# Patient Record
Sex: MALE | HISPANIC OR LATINO | URBAN - METROPOLITAN AREA
[De-identification: names, ages, dates, MRNs, and addresses within clinical notes are randomized per-mention and may not be internally consistent; named-entity substitution may affect disease eponyms.]

---

## 2018-06-13 ENCOUNTER — OFFICE VISIT (OUTPATIENT)
Dept: URBAN - METROPOLITAN AREA CLINIC 48 | Facility: CLINIC | Age: 68
End: 2018-06-13
Payer: COMMERCIAL

## 2018-06-13 PROCEDURE — 92014 COMPRE OPH EXAM EST PT 1/>: CPT | Performed by: OPHTHALMOLOGY

## 2018-06-13 ASSESSMENT — INTRAOCULAR PRESSURE
OD: 9
OS: 11

## 2018-06-13 NOTE — IMPRESSION/PLAN
Impression: Diplopia: H53.2.3 primary gaze:  ortho with fragoso and ACT
right gaze: 4LHT on fragoso, and possible ET flick on ACT but patient complains that his vision completely blurs when looks to right. This was tested without ACT several times. left gaze: 1LHT on fragoso, ortho on ACT
rigth head Ortho on fragoso
left head tilt Ortho on fragoso Plan: patient describes double vision as side to side.  patient wishes to continue to monitor without specialist consult

rtc 6 month

## 2018-11-29 ENCOUNTER — OFFICE VISIT (OUTPATIENT)
Dept: URBAN - METROPOLITAN AREA CLINIC 48 | Facility: CLINIC | Age: 68
End: 2018-11-29
Payer: MEDICARE

## 2018-11-29 DIAGNOSIS — H11.31 SUBCONJUNCTIVAL BLEEDING OF RIGHT EYE: ICD-10-CM

## 2018-11-29 DIAGNOSIS — H11.001 PTERYGIUM OF RIGHT EYE: ICD-10-CM

## 2018-11-29 PROCEDURE — 92012 INTRM OPH EXAM EST PATIENT: CPT | Performed by: OPHTHALMOLOGY

## 2018-11-29 ASSESSMENT — INTRAOCULAR PRESSURE
OD: 10
OS: 10

## 2018-11-29 NOTE — IMPRESSION/PLAN
Impression: Pterygium of right eye: H11.001. Plan: Patient  would like to think about surgery, will let us know when he is ready.

## 2018-11-29 NOTE — IMPRESSION/PLAN
Impression: Subconjunctival bleeding of right eye: H11.31.  Plan: continue to minitor should clear upon its own

## 2018-11-29 NOTE — IMPRESSION/PLAN
Impression: Diplopia: H53.2.3 primary gaze:  ortho with fragoso and ACT
right gaze: 4LHT on fragoso, and possible ET flick on ACT but patient complains that his vision completely blurs when looks to right. This was tested without ACT several times. left gaze: 1LHT on fragoso, ortho on ACT
rigth head Ortho on fragoso
left head tilt Ortho on fragoso Plan: Patient continues to have double vision, double vision nott worsened. 

RTC 4-6 months f/up

## 2019-04-30 ENCOUNTER — OFFICE VISIT (OUTPATIENT)
Dept: URBAN - METROPOLITAN AREA CLINIC 48 | Facility: CLINIC | Age: 69
End: 2019-04-30
Payer: COMMERCIAL

## 2019-04-30 DIAGNOSIS — H53.2 DIPLOPIA: Primary | ICD-10-CM

## 2019-04-30 PROCEDURE — 92012 INTRM OPH EXAM EST PATIENT: CPT | Performed by: OPHTHALMOLOGY

## 2019-04-30 ASSESSMENT — INTRAOCULAR PRESSURE
OS: 11
OD: 12

## 2019-04-30 NOTE — IMPRESSION/PLAN
Impression: Pterygium of right eye: H11.001. Plan: recurrent pterygium, patient is not symptomatic at this time. 


RTC 6 months follow up

## 2019-04-30 NOTE — IMPRESSION/PLAN
Impression: Diplopia: H53.2.3 ACT primary  gaze: ET flick
right gaze: XT  flick 


primary gaze:  ortho with fragoso and ACT
right gaze: 4LHT on fragoso---> 6 LHT 
left gaze: 1LHT on fragoso, ortho on ACT
rigth head tilt Ortho on fragoso --> ortho
left head tilt Ortho on fragoso--> 2LHT Plan: Patient continues to have double vision, double vision not worsened. 


RTC 6 months follow up with Dr. Elaine Ortega

## 2019-10-29 ENCOUNTER — OFFICE VISIT (OUTPATIENT)
Dept: URBAN - METROPOLITAN AREA CLINIC 48 | Facility: CLINIC | Age: 69
End: 2019-10-29
Payer: COMMERCIAL

## 2019-10-29 PROCEDURE — 92014 COMPRE OPH EXAM EST PT 1/>: CPT | Performed by: OPHTHALMOLOGY

## 2019-10-29 ASSESSMENT — INTRAOCULAR PRESSURE
OS: 12
OD: 14

## 2019-10-29 NOTE — IMPRESSION/PLAN
Impression: Age-related nuclear cataract, bilateral: H25.13. Plan: Not visually significant to patient Will monitor Cataract eval next exam if patient desires RTC 6 months  Cat eval  Long exam)

## 2019-10-29 NOTE — IMPRESSION/PLAN
Impression: Diplopia: H53.2.3 ACT primary  gaze: ET flick--> ET flick 
right gaze:3 XT Left gaze: ortho Up gaze: 2 ET Down Gaze: Ortho 

primary gaze:  ortho with fragoso and ACT--> 
right gaze: 4LHT on fragoso---> 6 LHT-->10  LHT
left gaze: 1LHT on fragoso, ortho on ACT--> oRTHO 
rigth head tilt Ortho on fragoso --> ortho--: oRTHO 
left head tilt Ortho on fragoso--> 2LHT--> 2lht Plan: Patient dilated today OU. Double vision is more less than same, Double vision was noticed a few days after surgery. continue to monitor. MRI of Brain ordered in June 2019. RTC  6 months follow up for double vision no dilation.  ( long Exam)

## 2020-09-09 ENCOUNTER — OFFICE VISIT (OUTPATIENT)
Dept: URBAN - METROPOLITAN AREA CLINIC 48 | Facility: CLINIC | Age: 70
End: 2020-09-09
Payer: COMMERCIAL

## 2020-09-09 DIAGNOSIS — H25.813 COMBINED FORMS OF AGE-RELATED CATARACT, BILATERAL: ICD-10-CM

## 2020-09-09 PROCEDURE — 92012 INTRM OPH EXAM EST PATIENT: CPT | Performed by: OPHTHALMOLOGY

## 2020-09-09 ASSESSMENT — INTRAOCULAR PRESSURE
OS: 12
OD: 13

## 2020-09-09 NOTE — IMPRESSION/PLAN
Impression: Combined forms of age-related cataract, bilateral: H25.813. Plan: RTC 3-4 months CAt eval if patient is interested.  ( long exam)

## 2020-09-09 NOTE — IMPRESSION/PLAN
Impression: Diplopia: H53.2.3 ACT primary  gaze: ET flick--> ET flick --> Ortho
right gaze:3 XT  -->5LHT Left gaze: ortho -->ortho Up gaze: 2 ET Down Gaze: Ortho 

primary gaze:  ortho with fragoso and ACT--> 
right gaze: 4LHT on fragoso---> 6 LHT-->10  LHT
left gaze: 1LHT on fragoso, ortho on ACT--> oRTHO 
rigth head tilt Ortho on fragoso --> ortho--: oRTHO 
left head tilt Ortho on fragoso--> 2LHT--> 2lht Plan: Patient has mild LHT that was notices since pterygium surgery, patient has had MRI showing no intracranial problems. Not bothered by it at this time. Continue to monitor. 

RTC 3-4 months DM ( long exam)

## 2021-01-11 ENCOUNTER — OFFICE VISIT (OUTPATIENT)
Dept: URBAN - METROPOLITAN AREA CLINIC 48 | Facility: CLINIC | Age: 71
End: 2021-01-11
Payer: COMMERCIAL

## 2021-01-11 DIAGNOSIS — H25.13 AGE-RELATED NUCLEAR CATARACT, BILATERAL: ICD-10-CM

## 2021-01-11 DIAGNOSIS — E11.9 TYPE 2 DIABETES MELLITUS W/O COMPLICATION: Primary | ICD-10-CM

## 2021-01-11 PROCEDURE — 92004 COMPRE OPH EXAM NEW PT 1/>: CPT | Performed by: OPTOMETRIST

## 2021-01-11 ASSESSMENT — INTRAOCULAR PRESSURE
OS: 15
OD: 16

## 2021-01-11 NOTE — IMPRESSION/PLAN
Impression: Age-related nuclear cataract, bilateral: H25.13. Plan: Disc with Patient, 

Continue to monitor.

## 2021-01-11 NOTE — IMPRESSION/PLAN
Impression: Type 2 diabetes mellitus w/o complication: C03.9. Plan: Disc with patient RTC 1 year for annual DM.

## 2022-09-12 ENCOUNTER — OFFICE VISIT (OUTPATIENT)
Dept: URBAN - METROPOLITAN AREA CLINIC 48 | Facility: CLINIC | Age: 72
End: 2022-09-12
Payer: COMMERCIAL

## 2022-09-12 DIAGNOSIS — H25.13 AGE-RELATED NUCLEAR CATARACT, BILATERAL: ICD-10-CM

## 2022-09-12 DIAGNOSIS — E11.9 TYPE 2 DIABETES MELLITUS W/O COMPLICATION: ICD-10-CM

## 2022-09-12 DIAGNOSIS — H11.001 PTERYGIUM OF RIGHT EYE: Primary | ICD-10-CM

## 2022-09-12 PROCEDURE — 99214 OFFICE O/P EST MOD 30 MIN: CPT | Performed by: STUDENT IN AN ORGANIZED HEALTH CARE EDUCATION/TRAINING PROGRAM

## 2022-09-12 ASSESSMENT — INTRAOCULAR PRESSURE
OD: 10
OS: 10

## 2022-09-12 NOTE — IMPRESSION/PLAN
Impression: Pterygium of right eye: H11.001. Plan: Mild recurrence Double vision likely a result of restriction from first surgery (double vision has been stable since his surgery, no diplopia in primary gazez) Continue to monitor

## 2022-09-12 NOTE — IMPRESSION/PLAN
Impression: Type 2 diabetes mellitus w/o complication: Y18.3.  Plan: - No retinopathy seen on exam today
- Continue glucose, BP, and lipid control as per PCP
- Continue with annual DFE

## 2023-06-26 ENCOUNTER — OFFICE VISIT (OUTPATIENT)
Dept: URBAN - METROPOLITAN AREA CLINIC 48 | Facility: CLINIC | Age: 73
End: 2023-06-26
Payer: COMMERCIAL

## 2023-06-26 DIAGNOSIS — H11.001 PTERYGIUM OF RIGHT EYE: Primary | ICD-10-CM

## 2023-06-26 PROCEDURE — 99213 OFFICE O/P EST LOW 20 MIN: CPT | Performed by: STUDENT IN AN ORGANIZED HEALTH CARE EDUCATION/TRAINING PROGRAM

## 2023-06-26 ASSESSMENT — INTRAOCULAR PRESSURE
OS: 10
OD: 11

## 2023-06-26 NOTE — IMPRESSION/PLAN
Impression: Pterygium of right eye: H11.001. Plan: LIkely component of allergic conjunctivitis but may have been inflamed recently Increase AT to QID OU Start Pataday qd OU Start cool compresses prn

RTC prn if symptoms do not improve Keep appointment in September as scheduled

## 2023-09-12 ENCOUNTER — OFFICE VISIT (OUTPATIENT)
Dept: URBAN - METROPOLITAN AREA CLINIC 48 | Facility: CLINIC | Age: 73
End: 2023-09-12
Payer: COMMERCIAL

## 2023-09-12 DIAGNOSIS — H11.001 PTERYGIUM OF RIGHT EYE: ICD-10-CM

## 2023-09-12 DIAGNOSIS — E11.9 TYPE 2 DIABETES MELLITUS W/O COMPLICATION: Primary | ICD-10-CM

## 2023-09-12 DIAGNOSIS — H25.813 COMBINED FORMS OF AGE-RELATED CATARACT, BILATERAL: ICD-10-CM

## 2023-09-12 PROCEDURE — 99214 OFFICE O/P EST MOD 30 MIN: CPT | Performed by: STUDENT IN AN ORGANIZED HEALTH CARE EDUCATION/TRAINING PROGRAM

## 2023-09-12 ASSESSMENT — INTRAOCULAR PRESSURE
OD: 12
OS: 12

## 2024-05-20 ENCOUNTER — OFFICE VISIT (OUTPATIENT)
Dept: URBAN - METROPOLITAN AREA CLINIC 48 | Facility: CLINIC | Age: 74
End: 2024-05-20
Payer: COMMERCIAL

## 2024-05-20 DIAGNOSIS — H11.001 PTERYGIUM OF RIGHT EYE: Primary | ICD-10-CM

## 2024-05-20 PROCEDURE — 99213 OFFICE O/P EST LOW 20 MIN: CPT | Performed by: STUDENT IN AN ORGANIZED HEALTH CARE EDUCATION/TRAINING PROGRAM

## 2024-05-20 ASSESSMENT — INTRAOCULAR PRESSURE
OS: 12
OD: 12

## 2024-09-12 ENCOUNTER — OFFICE VISIT (OUTPATIENT)
Dept: URBAN - METROPOLITAN AREA CLINIC 48 | Facility: CLINIC | Age: 74
End: 2024-09-12
Payer: COMMERCIAL

## 2024-09-12 DIAGNOSIS — H25.813 COMBINED FORMS OF AGE-RELATED CATARACT, BILATERAL: ICD-10-CM

## 2024-09-12 DIAGNOSIS — E11.9 TYPE 2 DIABETES MELLITUS W/O COMPLICATION: Primary | ICD-10-CM

## 2024-09-12 DIAGNOSIS — H11.001 PTERYGIUM OF RIGHT EYE: ICD-10-CM

## 2024-09-12 PROCEDURE — 99214 OFFICE O/P EST MOD 30 MIN: CPT | Performed by: STUDENT IN AN ORGANIZED HEALTH CARE EDUCATION/TRAINING PROGRAM

## 2024-09-12 ASSESSMENT — INTRAOCULAR PRESSURE
OD: 12
OS: 12

## 2025-08-28 ENCOUNTER — OFFICE VISIT (OUTPATIENT)
Dept: URBAN - METROPOLITAN AREA CLINIC 46 | Facility: CLINIC | Age: 75
End: 2025-08-28
Payer: COMMERCIAL

## 2025-08-28 DIAGNOSIS — E11.9 DIABETES MELLITUS TYPE 2 WITHOUT MENTION OF COMPLICATION: ICD-10-CM

## 2025-08-28 DIAGNOSIS — H11.061 RECURRENT PTERYGIUM OF RIGHT EYE: ICD-10-CM

## 2025-08-28 DIAGNOSIS — H25.13 AGE-RELATED NUCLEAR CATARACT, BILATERAL: Primary | ICD-10-CM

## 2025-08-28 DIAGNOSIS — H52.223 REGULAR ASTIGMATISM, BILATERAL: ICD-10-CM

## 2025-08-28 DIAGNOSIS — H43.393 OTHER VITREOUS OPACITIES, BILATERAL: ICD-10-CM

## 2025-08-28 PROCEDURE — 92004 COMPRE OPH EXAM NEW PT 1/>: CPT | Performed by: OPHTHALMOLOGY

## 2025-08-28 PROCEDURE — 92136 OPHTHALMIC BIOMETRY: CPT | Performed by: OPHTHALMOLOGY

## 2025-08-28 RX ORDER — OFLOXACIN 3 MG/ML
0.3 % SOLUTION/ DROPS OPHTHALMIC
Qty: 5 | Refills: 0 | Status: ACTIVE
Start: 2025-08-28

## 2025-08-28 RX ORDER — PREDNISOLONE ACETATE 10 MG/ML
1 % SUSPENSION/ DROPS OPHTHALMIC
Qty: 10 | Refills: 0 | Status: ACTIVE
Start: 2025-08-28

## 2025-08-28 ASSESSMENT — INTRAOCULAR PRESSURE
OS: 11
OD: 10

## 2025-08-28 ASSESSMENT — PACHYMETRY
OD: 2.98
OD: 23.14

## 2025-08-28 ASSESSMENT — KERATOMETRY
OS: 43.75
OD: 42.59

## 2025-08-28 ASSESSMENT — VISUAL ACUITY: OS: 20/40
